# Patient Record
Sex: MALE | Race: WHITE | NOT HISPANIC OR LATINO | URBAN - METROPOLITAN AREA
[De-identification: names, ages, dates, MRNs, and addresses within clinical notes are randomized per-mention and may not be internally consistent; named-entity substitution may affect disease eponyms.]

---

## 2023-06-12 ENCOUNTER — APPOINTMENT (OUTPATIENT)
Dept: URBAN - METROPOLITAN AREA SURGERY 17 | Age: 53
Setting detail: DERMATOLOGY
End: 2023-06-13

## 2023-06-12 VITALS
HEART RATE: 76 BPM | WEIGHT: 200 LBS | DIASTOLIC BLOOD PRESSURE: 70 MMHG | SYSTOLIC BLOOD PRESSURE: 128 MMHG | TEMPERATURE: 97.8 F | HEIGHT: 72 IN | RESPIRATION RATE: 12 BRPM

## 2023-06-12 PROBLEM — C44.311 BASAL CELL CARCINOMA OF SKIN OF NOSE: Status: ACTIVE | Noted: 2023-06-12

## 2023-06-12 PROCEDURE — OTHER CONSULTATION FOR MOHS SURGERY: OTHER

## 2023-06-12 PROCEDURE — 17311 MOHS 1 STAGE H/N/HF/G: CPT

## 2023-06-12 PROCEDURE — OTHER MIPS QUALITY: OTHER

## 2023-06-12 PROCEDURE — OTHER MOHS SURGERY: OTHER

## 2023-06-12 NOTE — PROCEDURE: MOHS SURGERY

## 2023-06-12 NOTE — PROCEDURE: MOHS SURGERY

## 2023-06-12 NOTE — PROCEDURE: MOHS SURGERY
[phone] Muscle Hinge Flap Text: The defect edges were debeveled with a #15 scalpel blade.  Given the size, depth and location of the defect and the proximity to free margins a muscle hinge flap was deemed most appropriate.  Using a sterile surgical marker, an appropriate hinge flap was drawn incorporating the defect. The area thus outlined was incised with a #15 scalpel blade.  The skin margins were undermined to an appropriate distance in all directions utilizing iris scissors. The flap was then moved into the primary defect.

## 2023-06-12 NOTE — PROCEDURE: MOHS SURGERY
Body Location Override (Optional - Billing Will Still Be Based On Selected Body Map Location If Applicable): Nasal bridge

## 2023-06-12 NOTE — PROCEDURE: CONSULTATION FOR MOHS SURGERY
Detail Level: Detailed
Body Location Override (Optional - Billing Will Still Be Based On Selected Body Map Location If Applicable): Nasal bridge
Size Of Lesion: 0.4
X Size Of Lesion In Cm (Optional): 0.3
Name Of The Referring Provider For Procedure: LENORE Brown PA-C
Incorporate Mauc In Note: Yes

## 2023-06-12 NOTE — PROCEDURE: MOHS SURGERY
